# Patient Record
Sex: MALE | Race: WHITE | NOT HISPANIC OR LATINO | URBAN - METROPOLITAN AREA
[De-identification: names, ages, dates, MRNs, and addresses within clinical notes are randomized per-mention and may not be internally consistent; named-entity substitution may affect disease eponyms.]

---

## 2018-03-18 ENCOUNTER — EMERGENCY (EMERGENCY)
Facility: HOSPITAL | Age: 38
LOS: 0 days | Discharge: LEFT BEFORE DISCHARGE | End: 2018-03-18
Attending: EMERGENCY MEDICINE

## 2018-03-18 VITALS
SYSTOLIC BLOOD PRESSURE: 169 MMHG | TEMPERATURE: 99 F | OXYGEN SATURATION: 98 % | DIASTOLIC BLOOD PRESSURE: 81 MMHG | HEART RATE: 109 BPM | RESPIRATION RATE: 21 BRPM | HEIGHT: 72 IN | WEIGHT: 315 LBS

## 2018-03-18 VITALS
DIASTOLIC BLOOD PRESSURE: 104 MMHG | SYSTOLIC BLOOD PRESSURE: 159 MMHG | OXYGEN SATURATION: 97 % | HEART RATE: 105 BPM | TEMPERATURE: 99 F | RESPIRATION RATE: 20 BRPM

## 2018-03-18 DIAGNOSIS — R07.89 OTHER CHEST PAIN: ICD-10-CM

## 2018-03-18 DIAGNOSIS — E78.1 PURE HYPERGLYCERIDEMIA: ICD-10-CM

## 2018-03-18 LAB
ALBUMIN SERPL ELPH-MCNC: 4.7 G/DL — SIGNIFICANT CHANGE UP (ref 3.5–5.2)
ALP SERPL-CCNC: 104 U/L — SIGNIFICANT CHANGE UP (ref 30–115)
ALT FLD-CCNC: 55 U/L — HIGH (ref 0–41)
AST SERPL-CCNC: 36 U/L — SIGNIFICANT CHANGE UP (ref 0–41)
BASOPHILS # BLD AUTO: 0.03 K/UL — SIGNIFICANT CHANGE UP (ref 0–0.2)
BASOPHILS NFR BLD AUTO: 0.3 % — SIGNIFICANT CHANGE UP (ref 0–1)
BILIRUB SERPL-MCNC: 0.3 MG/DL — SIGNIFICANT CHANGE UP (ref 0.2–1.2)
BUN SERPL-MCNC: 13 MG/DL — SIGNIFICANT CHANGE UP (ref 10–20)
CALCIUM SERPL-MCNC: 9.8 MG/DL — SIGNIFICANT CHANGE UP (ref 8.5–10.1)
CHLORIDE SERPL-SCNC: 102 MMOL/L — SIGNIFICANT CHANGE UP (ref 98–110)
CHOLEST SERPL-MCNC: 183 MG/DL — SIGNIFICANT CHANGE UP (ref 100–200)
CO2 SERPL-SCNC: 26 MMOL/L — SIGNIFICANT CHANGE UP (ref 17–32)
CREAT SERPL-MCNC: 1.1 MG/DL — SIGNIFICANT CHANGE UP (ref 0.7–1.5)
D DIMER BLD IA.RAPID-MCNC: 104 NG/ML DDU — SIGNIFICANT CHANGE UP (ref 0–230)
EOSINOPHIL # BLD AUTO: 0.15 K/UL — SIGNIFICANT CHANGE UP (ref 0–0.7)
EOSINOPHIL NFR BLD AUTO: 1.7 % — SIGNIFICANT CHANGE UP (ref 0–8)
GLUCOSE SERPL-MCNC: 109 MG/DL — SIGNIFICANT CHANGE UP (ref 70–110)
HCT VFR BLD CALC: 47 % — SIGNIFICANT CHANGE UP (ref 42–52)
HDLC SERPL-MCNC: 32 MG/DL — LOW (ref 40–125)
HGB BLD-MCNC: 16.2 G/DL — SIGNIFICANT CHANGE UP (ref 14–18)
IMM GRANULOCYTES NFR BLD AUTO: 0.3 % — SIGNIFICANT CHANGE UP (ref 0.1–0.3)
LIPID PNL WITH DIRECT LDL SERPL: 99 MG/DL — SIGNIFICANT CHANGE UP (ref 4–129)
LYMPHOCYTES # BLD AUTO: 2.64 K/UL — SIGNIFICANT CHANGE UP (ref 1.2–3.4)
LYMPHOCYTES # BLD AUTO: 29.1 % — SIGNIFICANT CHANGE UP (ref 20.5–51.1)
MCHC RBC-ENTMCNC: 27.9 PG — SIGNIFICANT CHANGE UP (ref 27–31)
MCHC RBC-ENTMCNC: 34.5 G/DL — SIGNIFICANT CHANGE UP (ref 32–37)
MCV RBC AUTO: 81 FL — SIGNIFICANT CHANGE UP (ref 80–94)
MONOCYTES # BLD AUTO: 0.74 K/UL — HIGH (ref 0.1–0.6)
MONOCYTES NFR BLD AUTO: 8.2 % — SIGNIFICANT CHANGE UP (ref 1.7–9.3)
NEUTROPHILS # BLD AUTO: 5.47 K/UL — SIGNIFICANT CHANGE UP (ref 1.4–6.5)
NEUTROPHILS NFR BLD AUTO: 60.4 % — SIGNIFICANT CHANGE UP (ref 42.2–75.2)
NRBC # BLD: 0 /100 WBCS — SIGNIFICANT CHANGE UP (ref 0–0)
PLATELET # BLD AUTO: 355 K/UL — SIGNIFICANT CHANGE UP (ref 130–400)
POTASSIUM SERPL-MCNC: 4 MMOL/L — SIGNIFICANT CHANGE UP (ref 3.5–5)
POTASSIUM SERPL-SCNC: 4 MMOL/L — SIGNIFICANT CHANGE UP (ref 3.5–5)
PROT SERPL-MCNC: 7 G/DL — SIGNIFICANT CHANGE UP (ref 6–8)
RBC # BLD: 5.8 M/UL — SIGNIFICANT CHANGE UP (ref 4.7–6.1)
RBC # FLD: 13.7 % — SIGNIFICANT CHANGE UP (ref 11.5–14.5)
SODIUM SERPL-SCNC: 143 MMOL/L — SIGNIFICANT CHANGE UP (ref 135–146)
TOTAL CHOLESTEROL/HDL RATIO MEASUREMENT: 5.7 RATIO — HIGH (ref 4–5.5)
TRIGL SERPL-MCNC: 731 MG/DL — HIGH (ref 10–149)
TROPONIN T SERPL-MCNC: <0.01 NG/ML — SIGNIFICANT CHANGE UP
WBC # BLD: 9.06 K/UL — SIGNIFICANT CHANGE UP (ref 4.8–10.8)
WBC # FLD AUTO: 9.06 K/UL — SIGNIFICANT CHANGE UP (ref 4.8–10.8)

## 2018-03-18 NOTE — ED PROVIDER NOTE - PROGRESS NOTE DETAILS
symptoms resolved after burping, however does have RF for CAD, will keep for 4hr trop/repeat ekg endorsed to Dr Riley patient now wants to sign out AMA, understands risks

## 2018-03-18 NOTE — ED PROVIDER NOTE - NS ED ROS FT
Review of Systems    Constitutional: (-) fever/ chills (-) weight loss  Eyes/ENT: (-) blurry vision, (-) epistaxis (-) sore throat (-) ear pain  Cardiovascular: (+) chest pain, (-) syncope (-) palpitations  Respiratory: (-) cough, (+) shortness of breath  Gastrointestinal: (-) vomiting, (-) diarrhea (-) abdominal pain, (+) nausea  Musculoskeletal: (-) neck pain, (-) back pain, (-) joint pain (-) pedal edema   Integumentary: (-) rash, (-) swelling  Neurological: (-) headache, (-) altered mental status, (+) dizziness  Psychiatric: (-) hallucinations or depression   Allergic/Immunologic: (-) pruritus

## 2018-03-18 NOTE — ED PROVIDER NOTE - OBJECTIVE STATEMENT
Pt comes in c/o sharp CP that has since resolved but is now feeling pressure. Pt states that he also has SOB, tingling to the left arm, dizziness and nausea. Pt denies any vomiting, back pain, PAYAN.

## 2018-03-18 NOTE — ED PROVIDER NOTE - INTERPRETATION
tachycardic/normal sinus rhythm, Normal axis, Normal [place] interval and QRS complex. There are no acute ischemic ST or T-wave changes.

## 2018-03-18 NOTE — ED PROVIDER NOTE - ATTENDING CONTRIBUTION TO CARE
37y male morbidly obese with sscp radiating to left arm with sob and dizziness, mostly resovled, on exam vs aprpeciated, comfortable, conj pink neck supple cor tachy, reg, lungs cta, abd snt calves nontender pulses equal neuro intact, will check EKG, cxr, labs, reassess

## 2018-04-26 ENCOUNTER — OUTPATIENT (OUTPATIENT)
Dept: OUTPATIENT SERVICES | Facility: HOSPITAL | Age: 38
LOS: 1 days | Discharge: HOME | End: 2018-04-26

## 2018-04-26 DIAGNOSIS — R53.83 OTHER FATIGUE: ICD-10-CM
